# Patient Record
Sex: MALE | Race: WHITE | Employment: FULL TIME | URBAN - METROPOLITAN AREA
[De-identification: names, ages, dates, MRNs, and addresses within clinical notes are randomized per-mention and may not be internally consistent; named-entity substitution may affect disease eponyms.]

---

## 2020-12-09 ENCOUNTER — TELEPHONE (OUTPATIENT)
Dept: ORTHOPEDIC SURGERY | Age: 55
End: 2020-12-09

## 2020-12-09 NOTE — TELEPHONE ENCOUNTER
Faxing Patient Information     Facility Name: [facility] Name: [address] [place] [address],[facility] Floor Number: [phone]  [place]  Fax Number: [phone]., SEND FAX REQUESTING MRI REPORT FOR PATIENT. BY 10 AM 12/10/20.

## 2020-12-10 ENCOUNTER — OFFICE VISIT (OUTPATIENT)
Dept: ORTHOPEDIC SURGERY | Age: 55
End: 2020-12-10
Payer: COMMERCIAL

## 2020-12-10 VITALS — BODY MASS INDEX: 20.04 KG/M2 | HEIGHT: 70 IN | TEMPERATURE: 97 F | WEIGHT: 140 LBS

## 2020-12-10 PROCEDURE — 99203 OFFICE O/P NEW LOW 30 MIN: CPT | Performed by: ORTHOPAEDIC SURGERY

## 2020-12-10 NOTE — LETTER
Shoulder Elbow Rehabilitation Referral    Patient Name: Ronald Macias      YOB: 1965    Diagnosis:   1. Acute pain of left shoulder        Precautions:     Post Op Instructions:  [] Continuous passive motion (CPM)  [] Elbow range of motion  [] Exercise in plane of scapula   []  Strengthening     [] Pulley and instruction    [x] Home exercise program (copy to patient)   [] Sling when arm at risk  [] Sling or brace at all times   [] AAROM: Forward elevation to             [] AAROM: External rotation to     [] Isometric external rotator strengthening [] AAROM: internal rotation: up the back  [] Isometric abductor strengthening  [] AAROM: Internal abduction     [] Isometric internal rotator strengthening [] AAROM: cross-body adduction             Stretching:     Strengthening:  [x] Four quadrant (FE, ER, IR, CBA)  [x] Rotator cuff (ER, IR, Abd)  [x] Forward Elevation    [x] External Rotators     [x] External Rotation    [x] Internal Rotators  [x] Internal Rotation: up/back   [x] Abductors     [x] Internal Rotation: supine in abduction  [] Flexors  [] Cross-body abduction    [] Extensors  [] Pendulum (FE, Abd/Add, cw/ccw)  [x] Scapular Stabilizers   [] Wall-walking (FE, Abd)    [x] Shoulder shrugs     [] Table slides      [x] Rhomboid pinch  [] Elbow (flex, ext, pron, sup)    [x] Lat.  Pull downs     [] Medial epicondylitis program    [] Forward punch   [] Lateral epicondylitis program    [] Internal rotators     [] Progressive resistive exercises  [] Bench Press        [] Bench press plus  Activities:     [x] Lateral pull-downs  [x] Rowing     [] Progressive two-hand supine press  [] Stepper/Exercise bike   [] Biceps: curls/supination  [] Swimming  [] Water exercises    Modalities: PRN    Return to Sport:  [] Ultrasound     [] Plyometrics  [] Iontophoresis     [] Rhythmic stabilization  [] Moist heat     [] Core strengthening   [] Massage     [] Sports specific program:   [x] Cryotherapy [] Electrical stimulation     [] Paraffin  [] Whirlpool  [] TENS    [x] Home exercise program (copy to patient). Perform exercises for:   15     minutes    2-3      times/day  [x] Supervised physical therapy  Frequency: []  1x week  [x] 2x week  [] 3x week  [] Other:   Duration: [] 2 weeks   [] 4 weeks  [x] 6 weeks  [] Other:     Additional Instructions:           Elbert Garg MD, PhD

## 2020-12-10 NOTE — PROGRESS NOTES
[facility]  Office Visit  New Patient  Date:  12/10/2020    Name:  Kwame Nails  Address:  [facility] [id number]    :  1965      Age:   63 y.o.    SSN:  [id number]      Medical Record Number:  <[id number]>    Chief Complaint:    Left shoulder pain    HPI:   Kwame Nails is a 63 y.o. male who presents for evaluation of left shoulder pain. Patient is here for a second opinion regarding his left shoulder. Patient states in 2019 he began having left shoulder pain. He denied injury at the time however he did develop to a level there is no longer able to play baseball recreationally. He did see a physician who gave him a steroid injection as well as began him with physical therapy. He states he failed these conservative treatment modalities and was referred to Dr. Edna Nino at [facility] for further evaluation and management. He subsequently underwent a left shoulder Be procedure with a rotator cuff repair and biceps tenodesis in 2020. He states he did gibson some stiffness early on but was having a progressively improving postoperative course up until October which she had a setback with increasing pain. He states he sought a second opinion with Dr. Raven Cueto 2 weeks ago. MRI was ordered and he was told that there is a possibility he had an infection in his shoulder. He is now scheduled for surgery for an arthroscopic debridement, lysis of adhesions, hardware removal as well as synovial biopsy. He states he would like a second opinion regarding if this surgery is necessary today. He gives the above story. He states he does not have pain at rest or at night. The pain is mostly in certain overhead reaching positions as well as sudden movements. He states that he feels his strength has returned. Denies any numbness or tingling in the upper extremity. States that he takes meloxicam for anti-inflammatory.      He denies any new numbness, tingling, fevers, chills, chest pain, shortness of breath, or any other new significant symptoms. Pain Assessment  Location of Pain: Shoulder  Location Modifiers: Left  Severity of Pain: 2  Quality of Pain: Aching, Sharp  Duration of Pain: Persistent  Frequency of Pain: Constant  Aggravating Factors: Other (Comment)(OUT/UP)  Limiting Behavior: Yes  Relieving Factors: Ice, Exercise  Work-Related Injury: No  Are there other pain locations you wish to document?: No    Past History:  History reviewed. No pertinent past medical history. Past Surgical History:   Procedure Laterality Date    APPENDECTOMY      SHOULDER SURGERY Left 07/16/2020    RCR, OSBALDO, BICEPS TENODESIS    SMALL INTESTINE SURGERY         Social History     Tobacco Use    Smoking status: Never Smoker    Smokeless tobacco: Never Used   Substance Use Topics    Alcohol use: Yes    Drug use: Never        Family History:  family history is not on file. No current outpatient medications on file. No Known Allergies      Review of Systems: A 14 point review of systems available in the scanned medical record as documented by the patient on 12/10/20. Today's review pertinent items are noted in HPI. Musculoskeletal ROS: positive for - pain in left shoulder      Physical Exam:  Temp 97 °F (36.1 °C)   Ht 5' 10\" (1.778 m)   Wt 140 lb (63.5 kg)   BMI 20.09 kg/m²     General: No acute distress, well nourished  CV: No obvious peripheral edema.  Normal peripheral pulses  Neuro: Alert & oriented x 3  Psych: Normal mood and affect    Left shoulder exam    Grossly incisions are well-healed without redness warmth or drainage  No atrophy noted in the periscapular region  Patient has symmetrical bicep contours with resisted flexion  Forward flexion-170 degrees bilaterally  Abduction-107 degrees bilaterally  Internal rotation-T12 left, T8 right  External rotation-45 degrees bilaterally  Internal rotation with arm abducted is 20 degrees on the left  5/5 strength with champagne toastRickie  5/5 strength with internal rotation/external rotation  Negative speeds, negative Yergason's  Negative Bonilla, negative Neer's  No tenderness palpation over the anterior rotator cuff, biceps tendon, AC joint today    Radiographic:  No new imaging today    Assessment:  Harshil Crouch is a 63 y.o. male with:  1. Status post left shoulder rotator cuff repair, biceps tenodesis and Be procedure in July 2020 with Dr. Nelson Villegas with mild residual joint stiffness    Impression:  Encounter Diagnoses   Name Primary?  Status post left rotator cuff repair     Acute pain of left shoulder Yes       Office Procedures:  Orders Placed This Encounter   Procedures    [facility] PT [facility] Physical Therapy     Referral Priority:   Routine     Referral Type:   Eval and Treat     Referral Reason:   Specialty Services Required     Requested Specialty:   Physical Therapy     Number of Visits Requested:   1       Plan:   Pertinent imaging was reviewed. The etiology, natural history, and treatment options for the disorder were discussed. The roles of activity modifications, medications, injections, physical therapy, and surgical interventions were all described to the patient and questions were answered. A thorough discussion was had with the patient today regarding his shoulder. Given his history and physical exam we feel the likelihood of infection is extraordinarily low. We feel overall he has good range of motion and good strength. We feel his positional pain is likely related to lingering stiffness in the shoulder. We would not recommend he undergo the surgery he is scheduled for at this time. We did recommend a corticosteroid injection into the shoulder as well as physical therapy to work with range of motion 1 week after the injection. The patient declined this as he wanted to put off a corticosteroid injection off as far as possible.   He states he will discuss with his insurance company as well as his current physical therapy program the possibility of switching over to doing therapy at our [facility] facility. Physical therapy orders were placed for him as well as a physical therapy letter in the event he does want to switch therapy providers. Otherwise we recommended he follow-up in 6 weeks time for repeat clinical exam. If he still having occasional pain and stiffness we can offer an injection at that time. [address] Fellow    The encounter with Harshil Crouch was supervised by Dr Jt Ellis who personally examined the patient and reviewed the plan. This dictation was performed with a verbal recognition program (DRAGON) and it was checked for errors. It is possible that there are still dictated errors within this office note. If so, please bring any errors to my attention for an addendum. All efforts were made to ensure that this office note is accurate.   _____________  I was physically present and personally supervised the [facility] Fellow in the evaluation and development of a treatment plan for this patient. I personally interviewed the patient and performed a physical examination. In addition, I discussed the patient's condition and treatment options with them. I have also reviewed and agree with the past medical, family and social history unless otherwise noted. All of the patient's questions were answered. Elbert Ellis MD, PhD  12/10/2020

## 2020-12-14 ENCOUNTER — TELEPHONE (OUTPATIENT)
Dept: ORTHOPEDIC SURGERY | Age: 55
End: 2020-12-14

## 2020-12-14 NOTE — TELEPHONE ENCOUNTER
General Question     Subject: WOULD LIKE TO KNOW IF MRI REPORTS OF LEFT SHOULDER WAS RECEIVED FROM [facility]? PLEASE CALL BACK TO DISCUSS.    Patient: Armando James [phone]